# Patient Record
(demographics unavailable — no encounter records)

---

## 2017-05-15 NOTE — RAD
Indication: Cough, shortness of breath.



2 views of the chest including dual energy PA views demonstrate no mediastinal shift.

Heart is normal size and configuration. Lungs are clear.



When compared to March 13, 2016 no significant change is noted.



IMPRESSION: No active cardiopulmonary disease is noted.

## 2017-05-15 NOTE — UC
Respiratory Complaint HPI





- HPI Summary


HPI Summary: 





PATIENT PRESENTS TO  WITH CC OF COUGH, POST NASAL DRIP, CONGESTION AND NASAL 

DISCHARGE X 5 DAYS.  SHE HAS HAD 1 X EPISODE OF PNA AND STATES SHE WANTED TO 

CATCH IT EARLY TO PREVENT ANOTHER PNA.  SHE SAYS HER SYMPTOMS ARE BRONCHITIS AS 

THIS IS WHAT SHE HAS HAD IN THE PAST.  SHE IS OTHERWISE HEALTHY.  SHE DENIES 

FEVERS, CHILLS OR SWEATS.  EDUCATED PATIENT ON ANTIBIOTICS AND BRONCHITIS.  





- History of Current Complaint


Chief Complaint: UCRespiratory


Stated Complaint: COUGH


Time Seen by Provider: 05/15/17 18:22


Hx Obtained From: Patient


Hx Last Menstrual Period: n/a


Pregnant?: No


Onset/Duration: Gradual Onset


Timing: Constant


Severity Initially: Moderate


Severity Currently: Moderate


Pain Intensity: 2


Pain Scale Used: 0-10 Numeric


Character: Cough: Productive


Aggravating Factors: Recumbent Position


Alleviating Factors: Upright Position


Associated Signs And Symptoms: Positive: URI, Nasal Congestion, Sinus Discomfort





- Risk Factors


Pulmonary Embolism Risk Factors: Negative


Cardiac Risk Factors: Negative


Pseudomonas Risk Factors: Negative


Tuberculosis Risk Factors: Negative





- Allergies/Home Medications


Allergies/Adverse Reactions: 


 Allergies











Allergy/AdvReac Type Severity Reaction Status Date / Time


 


Morphine Allergy  Vomiting Verified 05/15/17 18:15


 


Penicillins [PCN] Allergy  Fever Verified 05/15/17 18:15














PMH/Surg Hx/FS Hx/Imm Hx


Previously Healthy: Yes


Endocrine History Of: Reports: Diabetes


   Denies: Thyroid Disease


Cardiovascular History Of: Reports: Hypertension


   Denies: Cardiac Disorders


Respiratory History Of: Reports: Bronchitis - has had neb rxs in past, has 

 MDI at home


   Denies: COPD, Asthma


GI/ History Of: 


   Denies: Ulcer


Cancer History Of: 


   Denies: Breast Cancer





- Surgical History


Surgical History: Yes


Surgery Procedure, Year, and Place: choley, knee





- Family History


Known Family History: Positive: Hypertension, Diabetes





- Social History


Occupation: Unemployed


Lives: With Family


Alcohol Use: Occasionally


Substance Use Type: None


Smoking Status (MU): Current Every Day Smoker





Review of Systems


Constitutional: Fatigue


Skin: Negative


ENT: Nasal Discharge


Respiratory: Shortness Of Breath, Cough


Gastrointestinal: Negative


Genitourinary: Negative


Neurovascular: Negative


Psychological: Negative


All Other Systems Reviewed And Are Negative: Yes





Physical Exam


Triage Information Reviewed: Yes


Appearance: Well-Appearing, Well-Nourished


Vital Signs: 


 Initial Vital Signs











Temp  97.7 F   05/15/17 18:12


 


Pulse  90   05/15/17 18:12


 


Resp  18   05/15/17 18:12


 


BP  141/65   05/15/17 18:12











Vital Signs Reviewed: Yes


Eye Exam: Normal


Eyes: Positive: Conjunctiva Clear


ENT: Positive: Nasal congestion, Nasal drainage


Neck exam: Normal


Neck: Positive: Supple, No Lymphadenopathy


Respiratory Exam: Normal


Respiratory: Positive: Chest non-tender, Lungs clear


Cardiovascular Exam: Normal


Cardiovascular: Positive: RRR


Musculoskeletal Exam: Normal


Musculoskeletal: Positive: Strength Intact


Neurological: Positive: Alert


Psychological: Positive: Normal Response To Family, Age Appropriate Behavior


Skin Exam: Normal





Respiratory Course/Dx





- Course


Course Of Treatment: PATIENT EDUCATED ON ANTIBIOTICS AND BRONCHITIS.  PATIENT 

IS ENCOURAGED TO TAKE PREDNISONE AND COUGH MEDICATION INSTEAD OF ANTIBIOTICS AS 

COUGH SYMPTOMS HAVE BEEN PRESENT FOR ONLY 5 DAYS, LUNGS ARE CLEAR, CHEST XRAY 

IS NEGATIVE.  PATIENT IS OK WITH PLAN AND IS OK FOR DISCHARGE.  SHE AGREES TO 

COME BACK IF ANYTHING CHANGES.





- Differential Dx/Diagnosis


Differential Diagnosis/HQI/PQRI: Asthma, Bronchitis, Lower Resp Infection, 

Sinusitis


Provider Diagnoses: COUGH, URI





Discharge





- Discharge Plan


Condition: Stable


Disposition: HOME


Prescriptions: 


guaiFENesin/CODIEN 100MG-10MG* [Robitussin AC 100Mg-10Mg*] 10 ml PO Q4H PRN #

120 udc MDD 10


 PRN Reason: Cough


predniSONE TAB* [Deltasone TAB*] 10 mg PO DAILY #21 tab


Patient Education Materials:  Upper Respiratory Infection (ED)


Referrals: 


Alyson Nguyen MD [Primary Care Provider] - 


Additional Instructions: 


Follow up as needed


If symptoms become worse, come back to 


Humidifier in the home, honey and lemon will help


robitussin with codeine at bedtime


mucinex in the morning


prednisone as prescribed in the morning


if you develop a fever, chills, or aches, come back to

## 2017-07-06 NOTE — RAD
Indication: Shortness of breath, leukocytosis.



2 views of the chest including dual energy PA views demonstrates no mediastinal shift.

Minimal epicardial fat is noted in the left base. No alveolar consolidation is noted.



IMPRESSION: Minimal epicardial fat in the left base. Lungs are clear.

## 2017-07-06 NOTE — RAD
INDICATION:  Left flank abdominal pain.



COMPARISON:  Comparison is made with a prior pelvic ultrasound from February 19, 2009.



TECHNIQUE: A CT scan of the abdomen and pelvis was performed without intravenous or oral

contrast. Contiguous axial sections were obtained from the lung bases through the

symphysis pubis. Images were reconstructed in the coronal and sagittal planes.



FINDINGS: There is a small left pleural effusion and minimal atelectasis in the left lower

lobe.



The liver is moderately enlarged and decreased in attenuation most consistent with fatty

infiltration. No significant focal abnormality is seen on this noncontrast study. The

patient is status post cholecystectomy. The spleen and pancreas appear to be within normal

limits.



The adrenal glands and kidneys are normal in size. No renal calculi or hydronephrosis is

seen. No ureteral or bladder calculi are seen.



The aorta is normal in caliber. There is moderate calcific plaque present.



No significant enlarged retroperitoneal lymph nodes are seen.



The stomach, small and large bowel appear nondistended.  The appendix is not visualized. 

There is mild descending and sigmoid diverticulosis without evidence for diverticulitis.



The uterus is retroverted and normal in size. There is a 4.6 x 3.8 x 4.6 cm left adnexal

cyst most consistent with an ovarian cyst. There was a cyst noted in the left ovary on the

prior study which is smaller than on the current exam. This may represent a new cyst or

enlargement of the previously noted cyst. No free intraperitoneal air or fluid is seen.



No significant focal osseous abnormality is seen.



IMPRESSION:  

1. SMALL LEFT PLEURAL EFFUSION.

2. HEPATOMEGALY AND HEPATIC STEATOSIS.

3. STATUS POST CHOLECYSTECTOMY.

4. 4.6 CM LEFT OVARIAN CYST. RECOMMEND A PELVIC ULTRASOUND FOR FURTHER EVALUATION.

## 2017-07-06 NOTE — HP
CC:  Dr. Nguyen *

 

MEDICINE HISTORY AND PHYSICAL:

 

DATE OF ADMISSION:  17

 

PROVIDER:  Rigo Walls NP

 

ATTENDING PHYSICIAN:  Dr. Radha Nicholson *(dictated by Rigo Walls NP).

 

PRIMARY CARE PROVIDER:  Dr. Alyson Nguyen.

 

CHIEF COMPLAINT:  Left flank pain.

 

HISTORY OF PRESENT ILLNESS:  Ms. Du is a 64-year-old female who presents to 
the ER today with concerns for left flank pain that started yesterday in the 
early afternoon and worsened overnight.  The patient says that she originally 
thought she threw her back out.  She denies any accompanied fevers, chills, cold
, or flu symptoms.  She denies any chest pain, palpitations, cough.  She denies 
any dyspnea yesterday, but reports that when her pain became worse today, she 
started to feel short of breath.  She denies any abdominal pain, nausea, 
vomiting, or diarrhea. She denies any dysuria, hematuria, urinary frequency, or 
vaginal bleeding.  She denies any focal weakness or sensory loss, any new 
visual complaints, hearing complaints, or swallowing complaints.  She denies 
any new joint or muscle pain, rashes, or lesions.  The patient does report 
mechanical fall where she twisted her ankle the other day, but states that she 
did not twist her back or sustain any low back injuries.  She does report a 
history of urinary tract infection x1 and kidney stones in the past.

 

Here in the ER, the patient's white blood cell count was significant for 15.8 
and her CRP was noted at 9.47.  Urinalysis shows 2+ leukocyte esterase.  The 
patient did have a CT of the abdomen and pelvis that was negative for any 
obstructing stones.  No stones or hydronephrosis was seen.  No ureteral or 
bladder calculi were seen.  The patient did seem to have a 4.6-cm left ovarian 
cyst, which was also seen on a pelvic ultrasound with a small amount of fluid 
within the endometrial cavity.

 

PAST MEDICAL HISTORY:

1.  Type 2 diabetes.

2.  Hypertension.

3.  Hyperlipidemia.

4.  Osteoarthritis.

5.  Nephrolithiasis.

 

PAST SURGICAL HISTORY:  Cholecystectomy and right total knee replacement.

 

HOME MEDICATIONS:

1.  Meloxicam, dose unknown.

2.  Pravastatin 10 mg daily.

3.  Glipizide 5 mg b.i.d.

4.  Robitussin A-C 10 mL q.4 hours p.r.n.

5.  Metformin 1000 mg b.i.d.

6.  Irbesartan 75 mg daily.

 

ALLERGIES:  Include MORPHINE and PENICILLIN.

 

FAMILY HISTORY:  She reports her father who had two heart attacks and  from 
the second heart attack.  One sister with a history of sarcoma and a nerve tumor
, and a history of colon cancer on her father's side in aunts and uncles.

 

SOCIAL HISTORY:  She is a current cigarette smoker.  States she smokes 5 to 6 
cigarettes a day.  She had previously quit, but restarted when her  
. She drinks alcohol on a weekly basis usually when out with friends on the 
weekends. She denies any illicit drug use.  She is .  She currently 
lives with her nephew and his wife.  Her nephew, Alvin Lemus, is her surrogate 
decision maker.

 

REVIEW OF SYSTEMS:  As per HPI.

 

                               PHYSICAL EXAMINATION

 

GENERAL:  Ms. Du is a 64-year-old female who is lying in the ED stretcher, in 
no acute distress.

 

VITAL SIGNS:  Temperature 96.9, heart rate 85, respiratory rate 18, blood 
pressure _____/72, and O2 saturation is 100% on room air.

 

HEENT:  Head is atraumatic and normocephalic.  Face is symmetrical.  Pupils are 
equal, round, and reactive to light.  Extraocular movements are intact.  
Sclerae are anicteric.  Oral mucosa appears moist.

 

NECK:  Supple.  No lymphadenopathy appreciated.  The patient has full range of 
motion.

 

RESPIRATORY:  Lungs are clear to auscultation.  No accessory muscle use.

 

CARDIAC:  S1 and S2.  Heart sounds are regular rate and rhythm.  No murmurs, 
rubs, or gallops.  The patient has trace pretibial edema.  Distal pulses are 2+ 
bilaterally.

 

ABDOMEN:  Soft.  There is tenderness to left upper quadrant along the left 
flank. The patient does have positive CVA tenderness in the left side.  Bowel 
sounds are present times all 4 quadrants.

 

MUSCULOSKELETAL:  There is no clubbing or cyanosis.  The patient has full range 
of motion in all extremities.  No swelling noted to the left ankle.

 

NEURO:  The patient moves all extremities.  Cranial nerves II through XII are 
grossly intact.  Sensation is intact to light touch bilaterally to the lower 
extremities.

 

PSYCH:  She is alert and oriented x3.  Affect is appropriate.

 

SKIN:  Appears to be grossly intact, limited assessment.

 

 DIAGNOSTIC STUDIES/LAB DATA:  CBC:  WBC 15.8, hemoglobin 14.3, hematocrit 43, 
platelet count 295.  CMP:  Sodium 134, potassium 4.1, chloride 102, carbon 
dioxide 25, BUN 9, creatinine 0.71, glucose 201, lactic acid 1.3, calcium 9.3.  
Total bilirubin 1.1, AST 9, ALT 16, alk phos 70.  CRP 9.47.  Albumin 4.2, 
lipase 19. Urinalysis shows 2+ leukocyte esterase, 1+ wbc's.

 

ASSESSMENT AND PLAN:  Ms. Du is a 64-year-old female, who presents today with 
left flank pain.  We will watch her under observation status.  Plan is as 
follows:

 

1.  Left flank pain.  Suspect this is most likely pyelonephritis, although this 
could represent a left pleuritic pain.  She does report having some shortness 
of breath this morning that did get better once arriving here in the ER.  I 
will check a chest x-ray, PA and lateral, to make sure there is no developing 
pneumonia.  She does have an elevated white count, but no other remarkable labs 
and her urine does appear to have concern for infection.  At this point, we 
will start the patient on ceftriaxone and await urine culture.  Check the x-
ray.  If it appears that the patient has a developing infiltrate, we could add 
on azithromycin.  At this point in time, it feels like this cause is more 
likely to be a developing pyelonephritis, but we will continue to monitor and 
see.  The patient's pain is controlled with p.r.n. pain medications.  We will 
continue to hydrate her and recheck labs tomorrow.

2.  Type 2 diabetes.  The patient's random glucose is 201.  I am not sure how 
well controlled the patient is at home.  We will check a hemoglobin A1c.  She 
may benefit from a diabetes educator consult.  Additionally, we will continue 
her on fingerstick blood glucose checks a.c. with insulin sliding scale 
coverage and hold her home metformin and glipizide while she is here in the 
hospital.

3.  Hypertension.  Continue home irbesartan.

4.  Hyperlipidemia.  Continue home pravastatin.

5.  FEN.  The patient is ordered consistent carbohydrate diet.

6.  DVT prophylaxis.  The patient is ordered subcu heparin.

7.  Code status:  She is a full code.

 

TIME SPENT:  Time spent on this admission was approximately 60 minutes, more 
than half the time was spent face-to-face with the patient obtaining history 
and physical, performing the physical examination, and reviewing the plan of 
care. Plan of care was also reviewed with my attending, Dr. Radha Nicholson,, 
who is in agreement.

 

 ____________________________________ RIGO WALLS, GIBSON

 

[id number]/CPS #: [id number]

DONG

## 2017-07-06 NOTE — RAD
HISTORY: Left ovarian cyst



COMPARISONS: CT dated July 06, 2017



TECHNIQUE: Multiple transverse and longitudinal ultrasound images were obtained of the

pelvis using grayscale, color Doppler, and spectral Doppler imaging using the endovaginal

transducer.



FINDINGS:



UTERUS: The uterus measures 4.5 x 2.3 x 4 cm. The uterus is normal in shape, size,

contour, and echotexture.

ENDOMETRIUM: The endometrial stripe is smooth. The endometrium measures 0.3 cm in

thickness. There is a small amount of fluid along the endometrial cavity.



CUL-DE-SAC: There is no free fluid within the cul-de-sac.



RIGHT OVARY: The right ovary is not well-visualized

LEFT OVARY: The left ovary measures 5.6 x 4 x 5.3 cm.  Normal arterial and venous

waveforms are identifiable within the ovary on spectral Doppler imaging.  There is a 4.5 x

3.5 x 4.7 cm simple cyst of the left ovary corresponding to the finding noted on CT



BLADDER: The bladder is not well visualized.



IMPRESSION: 

1.  4.7 CM SIMPLE CYST OF LEFT OVARY. RECOMMEND ATTENTION ON FOLLOW-UP IMAGING.

2.  SMALL AMOUNT OF FLUID WITHIN THE ENDOMETRIAL CAVITY. THIS WOULD NOT BE EXPECTED TO BE

PHYSIOLOGIC WITHIN A POSTMENOPAUSAL FEMALE.

## 2017-07-08 NOTE — DS
CC:  Dr. Nguyen*

 

DISCHARGE SUMMARY:

 

DATE OF ADMISSION:  07/06/17

 

DATE OF DISCHARGE:  07/07/17

 

PRIMARY CARE PROVIDER:  Dr. Alyson Nguyen.

 

PRIMARY DIAGNOSIS:  Left flank pain.

 

SECONDARY DIAGNOSES:

1.  Type 2 diabetes.

2.  Tobacco dependence.

3.  Hypertension.

4.  Hyperlipidemia.

5.  History of nephrolithiasis.

 

MEDICATIONS ON DISCHARGE:

1.  Pravastatin 10 mg daily.

2.  Glipizide 5 mg twice daily.

3.  Robitussin with Codeine 10 mL every 4 hours as needed for cough.

4.  Metformin 1000 mg twice daily.

5.  Irbesartan 75 mg daily.

6.  Chantix starter pack.

 

IMAGING STUDIES:  CT abdomen and pelvis, impression:  Small left pleural 
effusion. Hepatomegaly and hepatosteatosis, status post cholecystectomy.  A 4.6 
CM LEFT OVARIAN CYST.

 

Transvaginal ultrasound, impression:  4.7 CM SIMPLE CYST OF THE LEFT OVARY. 
Recommend attention on followup imaging.  Small amount of fluid within the 
endometrial cavity.  This would not be expected to be physiologic in a 
postmenopausal female.

 

PERTINENT MICROBIOLOGY:  Negative urine culture.

 

PERTINENT LABORATORY DATA:  White blood cell count on presentation 15.8, 77% 
neutrophils, decreased to 8.7 with one dose of ceftriaxone as well as ketorolac.

 

HISTORY OF PRESENT ILLNESS AND HOSPITAL COURSE:  This is a 64-year-old female 
presented to the hospital with left flank pain, starting the night before.  She 
underwent a CT abdomen and pelvis notable for left-sided ovarian cyst; where 
the transvaginal ultrasound confirming the ovarian cyst thought to be simple, 
however, associated with small amount of pelvic fluid.  She was treated with 
ceftriaxone for a presumed urinary tract infection based on the urinalysis, 
which had leuk esterase as well as white blood cells although no bacteria.  A 
urine culture returned negative.  She remained afebrile during the course of 
the hospital stay.  Not thought that she has pyelonephritis at this time based 
on the CT abdomen and pelvis as well as negative urine culture and urinalysis 
in the absence of bacteria. Unclear etiology of her left flank pain; however, 
resolved overnight with minimal intervention.  I did not think the antibiotics 
contributed to the resolution of her pain.  She did have an elevated 
leukocytosis on presentation, however, was not left shifted.  At this time, she 
is stable for discharge and will not continue antibiotics.  She should have 
attention to her left ovarian cyst at followup.

 

The patient indicates she is still an active smoker five to six cigarettes per 
day, however, is interested in quitting.  She has been successful on Chantix in 
the past and Chantix is prescribed on discharge from this hospital stay.

 

At followup, please;

 

1.  Consider rechecking CBC for continued resolution of leukocytosis off of 
antibiotics.

2.  Followup left-sided simple cyst as deemed appropriate.

3.  Encourage continued smoking cessation, compliance with the Chantix.

4.  No other specific labs or vitals that need follow up.

 

Reasons to return to the hospital included, but not limited to recurrent or 
worsening symptoms including left-sided flank pain, abdominal pain, nausea, 
vomiting, lightheadedness, loss of consciousness, fevers, chills, night sweats, 
chest pain, shortness of breath, inability to obtain or tolerate medications 
were discussed with the patient at length, she acknowledged understanding.

 

TIME SPENT:  Greater than 45 minutes was spent on the discharge of this patient
, greater than half was spent face-to-face with the patient.

 

 [id number]/CPS #: [id number]

DONG

## 2017-07-09 NOTE — ED
Eloisa DONNELLY Salem, scribed for Blayne Huynh MD on 07/06/17 at 1100 .





Back Pain





- HPI Summary


HPI Summary: 





Patient is a 65 y/o F who presents to the ED with left flank pain since 

yesterday afternoon, worse since this morning. She reports SOB, but denies CP, 

fever, dysuria, hematuria, or frequency. Pt is present at the ED with her 

sister. PMHx significant for kidney stones. She denies any recent trauma. 





- History of Current Complaint


Chief Complaint: EDChestPainROMI


Stated Complaint: SOB/LT FLANK PAIN


Time Seen by Provider: 07/06/17 10:59


Hx Obtained From: Patient


Hx Last Menstrual Period: n/a


Onset/Duration: Gradual Onset, Lasting Days, Still Present, Worse Since


Onset/Duration: Started Days Ago, Atraumatic, Still Present, Worse Since


Timing: Constant, Lasting Days


Back Pain Location: Is Discrete @ - Left CVA.


Severity Initially: Moderate


Severity Currently: Moderate


Pain Intensity: 10


Pain Scale Used: 0-10 Numeric


Character: Sharp


Aggravating Symptom(s): Movement - Recumbent position.


Alleviating Symptom(s): Rest, Position


Associated Signs And Symptoms: Positive: Flank Pain





- Allergies/Home Medications


Allergies/Adverse Reactions: 


 Allergies











Allergy/AdvReac Type Severity Reaction Status Date / Time


 


Morphine Allergy  Vomiting Verified 05/15/17 18:15


 


Penicillins [PCN] Allergy  Fever Verified 05/15/17 18:15














PMH/Surg Hx/FS Hx/Imm Hx


Endocrine/Hematology History: Reports: Hx Diabetes


   Denies: Hx Thyroid Disease


Cardiovascular History: Reports: Hx Hypertension


Respiratory History: 


   Denies: Hx Asthma, Hx Chronic Obstructive Pulmonary Disease (COPD)


GI History: 


   Denies: Hx Ulcer





- Surgical History


Surgery Procedure, Year, and Place: choley, knee


Infectious Disease History: No


Infectious Disease History: 


   Denies: Hx Hepatitis, Hx Human Immunodeficiency Virus (HIV), Traveled 

Outside the [place] in Last 30 Days





- Family History


Known Family History: Positive: Hypertension, Diabetes





- Social History


Alcohol Use: None


Substance Use Type: Reports: None


Hx Tobacco Use: Yes


Smoking Status (MU): Current Every Day Smoker





Review of Systems


Negative: Fever, Chills


Negative: Erythema


Negative: Sore Throat


Negative: Chest Pain


Positive: Shortness Of Breath.  Negative: Cough


Negative: Abdominal Pain, Vomiting, Nausea


Positive: flank pain.  Negative: dysuria, frequency, hematuria


Negative: Myalgia, Edema


Negative: Rash


Neurological: Other - No dizziness. 


All Other Systems Reviewed And Are Negative: Yes





Physical Exam





- Summary


Physical Exam Summary: 





Constitutional: Well-developed, Well-nourished, Alert. (-) Distressed. Appears 

very uncomfortable. 


Skin: Warm, Dry


HENT: Normocephalic; Atraumatic


Eyes: Conjunctiva normal


Neck: Musculoskeletal ROM normal neck. (-) JVD, (-) Stridor, (-) Tracheal 

deviation


Cardio: Rhythm regular, rate normal, Heart sounds normal; Intact distal pulses; 

The pedal pulses are 2+ and symmetric. Radial pulses are 2+ and symmetric. (-) 

Murmur


Pulmonary/Chest wall: Effort normal. (-) Respiratory distress, (-) Wheezes, (-) 

Rales


Abd: Soft, , (-) Distension, (-) Guarding, (-) Rebound. 


Musculoskeletal: (-) Edema. Left CVA tenderness. 


Lymph: (-) Cervical adenopathy


Neuro: Alert, Oriented x3


Psych: Mood and affect Normal


Triage Information Reviewed: Yes


Vital Signs On Initial Exam: 


 Initial Vitals











Temp Pulse Resp BP Pulse Ox


 


 96.9 F   85   17   148/72   100 


 


 07/06/17 09:11  07/06/17 09:11  07/06/17 09:11  07/06/17 09:11  07/06/17 09:11











Vital Signs Reviewed: Yes





- Taye Coma Scale


Coma Scale Total: 15





Diagnostics





- Vital Signs


 Vital Signs











  Temp Pulse Resp BP Pulse Ox


 


 07/06/17 09:59  96.9 F  85  17  148/72  100


 


 07/06/17 09:11  96.9 F  85  17  148/72  100














- Laboratory


Result Diagrams: 


 07/06/17 11:00





 07/06/17 12:03


Lab Statement: Any lab studies that have been ordered have been reviewed, and 

results considered in the medical decision making process.





- CT


  ** ABD/PELVIS


CT Interpretation Completed By: Radiologist - IMPRESSION:   1. SMALL LEFT 

PLEURAL EFFUSION. 2. HEPATOMEGALY AND HEPATIC STEATOSIS. 3. STATUS POST 

CHOLECYSTECTOMY. 4. 4.6 CM LEFT OVARIAN CYST. RECOMMEND A PELVIC ULTRASOUND FOR 

FURTHER EVALUATION.





- Ultrasound


  ** No standard instances


Ultrasound Interpretation Completed By: Radiologist - TRANSVAGINAL US IMPRESSION

:  1.  4.7 CM SIMPLE CYST OF LEFT OVARY. RECOMMEND ATTENTION ON FOLLOW-UP 

IMAGING. 2.  SMALL AMOUNT OF FLUID WITHIN THE ENDOMETRIAL CAVITY. THIS WOULD 

NOT BE EXPECTED TO BE PHYSIOLOGIC WITHIN A POSTMENOPAUSAL FEMALE.





- EKG


  ** 1042


EKG Interpretation: NSR @99 bpm. No STEMI. 





Back Pain Course/Dx





- Course


Course Of Treatment: 65 y/o F presents with left flank pain since yesterday 

afternoon, worse since this morning. She reports SOB, but denies CP, fever, 

dysuria, hematuria, or frequency. Pt received Dilaudid and Toradol in the ED 

course. CT shows, per radiology, IMPRESSION:  1. SMALL LEFT PLEURAL EFFUSION.  

2. HEPATOMEGALY AND HEPATIC STEATOSIS.  3. STATUS POST CHOLECYSTECTOMY.  4. 4.6 

CM LEFT OVARIAN CYST. RECOMMEND A PELVIC ULTRASOUND FOR FURTHER EVALUATION. EKG 

shows NSR @99 bpm. No STEMI. US shows, per radiology, IMPRESSION:  1.  4.7 CM 

SIMPLE CYST OF LEFT OVARY. RECOMMEND ATTENTION ON FOLLOW-UP IMAGING.  2.  SMALL 

AMOUNT OF FLUID WITHIN THE ENDOMETRIAL CAVITY. THIS WOULD NOT BE EXPECTED TO 

BE.  PHYSIOLOGIC WITHIN A POSTMENOPAUSAL FEMALE. Pt will be admitted.





- Diagnoses


Differential Diagnosis/HQI/PQRI: Positive: Other - Pyelonephritis. Kidney 

stones.


Provider Diagnoses: 


 Pyelonephritis








- Provider Notifications


Discussed Care Of Patient With: Radha Garcia


Time Discussed With Above Provider: 14:35


Instructed by Provider To: Admit As Inpatient


Admit/Transition Orders Completed By ED Provider: Yes





Discharge





- Discharge Plan


Condition: Stable


Disposition: ADMITTED TO [facility]


Referrals: 


Alyson Nguyen MD [Primary Care Provider] - 





The documentation as recorded by the Eloisa hardin Salem accurately reflects 

the service I personally performed and the decisions made by Lino ceballos Jerry, MD.

## 2018-02-20 NOTE — ED
GI/ HPI





- HPI Summary


HPI Summary: 


65 female presents with right flank pain for the past couple days.  She has 

history of kidney stones and states feels the same.  She has been taking her 

oxycodone without relief.  She denies any pain with urination.  She denies any 

fevers.  She is unsure about hematuria.  She denies any nausea or vomiting.  

She denies any diarrhea or constipation.  She does not have a urologist.  She 

has had her gallbladder removed.  She is never required surgery for her kidney 

stones.  She is a diabetic.  She denies any injury.  She is in intense pain. 

She denies any chest pain or SOB. 








- History of Current Complaint


Chief Complaint: EDFlankPain


Time Seen by Provider: 02/20/18 11:07


Stated Complaint: RT FLANK PAIN


Hx Last Menstrual Period: n/a


Pain Intensity: 10





- Additional Pertinent History


Primary Care Physician: DMITRY





- Allergy/Home Medications


Allergies/Adverse Reactions: 


 Allergies











Allergy/AdvReac Type Severity Reaction Status Date / Time


 


morphine Allergy  Nausea And Verified 02/20/18 11:41





   Vomiting  


 


Penicillins Allergy  See Comment Verified 02/20/18 11:41














PMH/Surg Hx/FS Hx/Imm Hx


Endocrine/Hematology History: Reports: Hx Diabetes


   Denies: Hx Thyroid Disease


Cardiovascular History: Reports: Hx Hypercholesterolemia, Hx Hypertension


Respiratory History: 


   Denies: Hx Asthma, Hx Chronic Obstructive Pulmonary Disease (COPD)


GI History: 


   Denies: Hx Ulcer


Sensory History: Reports: Hx Contacts or Glasses - for reading


   Denies: Hx Hearing Aid, Other Sensory Impairments


Opthamlomology History: Reports: Hx Contacts or Glasses - for reading


   Denies: Other Sensory Impairments





- Surgical History


Surgery Procedure, Year, and Place: choley, knee


Infectious Disease History: No


Infectious Disease History: 


   Denies: Hx Clostridium Difficile, Hx Hepatitis, Hx Human Immunodeficiency 

Virus (HIV), Traveled Outside the [place] in Last 30 Days





- Family History


Known Family History: Positive: Hypertension, Diabetes





- Social History


Alcohol Use: Occasionally


Substance Use Type: Reports: None


Hx Tobacco Use: Yes


Smoking Status (MU): Former Smoker





Review of Systems


Negative: Fever


Negative: Chest Pain


Negative: Shortness Of Breath


Negative: Abdominal Pain, Vomiting, Diarrhea, Nausea


Positive: flank pain


All Other Systems Reviewed And Are Negative: Yes





Physical Exam


Triage Information Reviewed: Yes


Vital Signs On Initial Exam: 


 Initial Vitals











Temp Pulse Resp BP Pulse Ox


 


 97.4 F   75   20   129/86   100 


 


 02/20/18 11:02  02/20/18 11:02  02/20/18 11:02  02/20/18 11:02  02/20/18 11:02











Vital Signs Reviewed: Yes


Appearance: Positive: Pain Distress


Skin: Positive: Warm, Dry


Head/Face: Positive: Normal Head/Face Inspection


Eyes: Positive: Normal, Conjunctiva Clear


Respiratory/Lung Sounds: Positive: Clear to Auscultation, Breath Sounds Present


Cardiovascular: Positive: Normal, RRR


Abdomen Description: Positive: Nontender, Soft, CVA Tenderness (R)


Bowel Sounds: Positive: Present


Musculoskeletal: Positive: Normal


Neurological: Positive: Normal


Psychiatric: Positive: Normal





Diagnostics





- Vital Signs


 Vital Signs











  Temp Pulse Resp BP Pulse Ox


 


 02/20/18 11:02  97.4 F  75  20  129/86  100














- Laboratory


Result Diagrams: 


 02/20/18 11:15





 02/20/18 11:15


Lab Statement: Any lab studies that have been ordered have been reviewed, and 

results considered in the medical decision making process.





Re-Evaluation





- Re-Evaluation


  ** First Eval


Re-Evaluation Time: 12:18


Change: Improved


Comment: felling better except intense pain when moves





GIGU Course/Dx





- Course


Course Of Treatment: 65 female presents with right flank pain for the past 

couple days.  She has history of kidney stones and states feels the same.  She 

has been taking her oxycodone without relief.  She denies any pain with 

urination.  She denies any fevers.  She is unsure about hematuria.  She denies 

any nausea or vomiting.  She denies any diarrhea or constipation.  She does not 

have a urologist.  She has had her gallbladder removed.  She is never required 

surgery for her kidney stones.  She is a diabetic.  She denies any injury.  She 

is in intense pain.  on exam pos CVA tenderness right. nontender abdomen. labs 

wnl. CT abd: no obstruction. left adnexal cystic structure. urine likely 

contaminate. will treat as sprain. will give muscle relaxer and short course of 

pain medication. patient understand and agrees with plan.





- Diagnoses


Differential Diagnoses - Female: Pyelonephritis, Urinary Tract Infection, 

Ureteral Calculi


Provider Diagnoses: 


 Flank pain








Discharge





- Discharge Plan


Condition: Good


Disposition: HOME


Prescriptions: 


Cyclobenzaprine TAB* [Flexeril 10 MG TAB*] 10 mg PO TID PRN #15 tab


 PRN Reason: Pain


Lidocaine PATCH 5%* [Lidoderm 5% Patch*] 1 patch TRANSDERM DAILY #6 patch


oxyCODONE/Acetamin 5/325 MG* [Percocet 5/325 TAB*] 1 tab PO Q6H PRN #8 tab MDD 4


 PRN Reason: Pain


Patient Education Materials:  Flank Pain (ED)


Referrals: 


Alyson Nguyen MD [Primary Care Provider] - 


Additional Instructions: 


Take muscle relaxers three times a day 


Apply lidocaine patches to area for up to 12 hours in one 24 hour period


Use Tylenol for pain every 6 hours, use narcotic for break though pain


ice/heat area, move as much as possible 


Follow up with primary within 5 days


Return to ED if develop any new or worsening symptoms

## 2018-02-20 NOTE — RAD
Indication: Right flank pain.



CT of the abdomen and pelvis was performed after oral and IV contrast administration.

Coronal and sagittal reconstructed images were obtained. Comparison is made with previous

exam dated July 06, 2017.



Lung bases demonstrate no pleural fluid, nodules or masses. Heart is of normal size

without evidence of pericardial effusion.



The liver is normal in size. No focal lesions or intrahepatic ductal dilatation is noted.

The spleen is normal in size. Pancreas demonstrates no mass or pancreatic ductal

dilatation. The common duct is not dilated. The patient status post cholecystectomy.



No adrenal lesions are noted. The kidneys demonstrates no hydronephrosis. No

retroperitoneal adenopathy is noted. No evidence of abdominal aortic aneurysm is noted

although atherosclerosis of the abdominal aorta is noted.



CT of the pelvis demonstrates no pelvic adenopathy.



There is a left adnexal cyst measuring up to 4.0 cm. This is not significantly changed

since previous exam of July 06, 2017. No free fluid is noted. The colon is filled with

stool. No free fluid is identified. The urinary bladder is unremarkable. Appendix is not

definitively identified.



The bony structures demonstrates multilevel degenerative disc disease of the lumbar spine.



IMPRESSION: No evidence of obstructive uropathy is noted.



Left adnexal cystic structure measuring up to 4 cm. This is unchanged from July 06, 2017.

Patient status post cholecystectomy. Appendix is not definitively identified.